# Patient Record
(demographics unavailable — no encounter records)

---

## 2025-07-03 NOTE — HISTORY OF PRESENT ILLNESS
[FreeTextEntry1] : 39 yo female presents with her  for newly diagnosed left breast invasive ductal carcinoma ([facility] PENDING). The preliminary results from [facility] are ER positive, [place] positive, HER2 negative with a low Ki-67. Patient presented for routine screening mammography in May which identified spiculated mass in the left breast. Diagnostic views were obtained which identified a 11 mm mass at 6:00 1cmFN. The patient was undergoing fertility evaluation and consideration for egg freezing. Biopsy was completed on 6/24/2025 and yielded invasive ductal carcinoma ([facility] PENDING). I discussed the diagnosis with the patient and her  at length.  I recommended genetic testing to be done today due to the family history of breast cancer and the young age of diagnosis.  She is amenable and agrees.  I also recommended bilateral breast MRI to evaluate the extent of disease as well as the contralateral breast and bilateral axilla.  We did offer psych oncology. The patient states that she would appreciate that very much.  She also states that she is interested in nutrition and information for healthy lifestyle and eating. She was also interested in referral to fertility at [facility] for a second opinion. I discussed the surgical options with the patient and her .  She would like to try for breast conservation if possible.  Family history of a maternal aunt breast cancer in her 70's.

## 2025-07-03 NOTE — DATA REVIEWED
[FreeTextEntry1] : 5/3/2025 ([facility]) bilateral screening mammogram: Heterogeneously dense. Spiculated mass in the central left breast posterior depth is indeterminant, additional views with targeted ultrasound is recommended. Bilateral complete ultrasound is recommended as patient dense tissue. BI-RADS 0  5/22/25 ([facility]) left diag mammo and left ultrasound: spiculated left breast mass at 6:00 1cmFN measuring 6 x 6 x 11 mm, correlates with mammo findings, further evaluation with US core biopsy is advised. BI-RADS 4  6/24/25 ([facility]) Left US core biopsy 6n1 11 mm mass [cork clip]: Invasive ductal carcinoma with focal micropapillary features, moderately differentiated. Ductal carcinoma in situ, intermediate to high nuclear grade, solid and cribriform type, central necrosis. Pathology results are concordant with the imaging findings. Surgical consultation is advised.

## 2025-07-03 NOTE — ASSESSMENT
[FreeTextEntry1] : 38-year-old female with a newly diagnosed left breast invasive ductal carcinoma, ER positive, [place] positive, HER2 negative Risks/benefits discussed with patient/surrogate

## 2025-07-03 NOTE — PAST MEDICAL HISTORY
[Menstruating] : The patient is menstruating [Menarche Age ____] : age at menarche was [unfilled] [Definite ___ (Date)] : the last menstrual period was [unfilled] [Irregular Cycle Intervals] : are  irregular [Total Preg ___] : G[unfilled] [History of Hormone Replacement Treatment] : has no history of hormone replacement treatment [FreeTextEntry6] : no [FreeTextEntry7] : yes - 20 yrs [FreeTextEntry8] : n/a

## 2025-07-03 NOTE — PLAN
[TextEntry] : Bilateral breast MRI Follow-up results of biomarkers Follow-up genetic testing results Referral to psych  oncology Referral to fertility Referral to nutrition Patient to follow-up after imaging completed for surgical planning

## 2025-07-14 NOTE — ASSESSMENT
[FreeTextEntry1] : 38-year-old female with a left breast invasive ductal carcinoma, ER positive, [place] positive, HER2 negative

## 2025-07-14 NOTE — DATA REVIEWED
[FreeTextEntry1] : 5/3/2025 ([facility]) bilateral screening mammogram: Heterogeneously dense. Spiculated mass in the central left breast posterior depth is indeterminant, additional views with targeted ultrasound is recommended. Bilateral complete ultrasound is recommended as patient dense tissue. BI-RADS 0  5/22/25 ([facility]) left diag mammo and left ultrasound: spiculated left breast mass at 6:00 1cmFN measuring 6 x 6 x 11 mm, correlates with mammo findings, further evaluation with US core biopsy is advised. BI-RADS 4  6/24/25 ([facility]) Left US core biopsy 6n1 11 mm mass [cork clip]: Invasive ductal carcinoma with focal micropapillary features, moderately differentiated. Ductal carcinoma in situ, intermediate to high nuclear grade, solid and cribriform type, central necrosis. Pathology results are concordant with the imaging findings. Surgical consultation is advised. ESTROGEN RECEPTOR: Positive % strong PROGESTERONE RECEPTOR: Positive % strong HER2 Negative (Score 0)  7/8/25 ([facility]) MRI: Recommend surgical management of known carcinoma in the left breast. No additional suspicious region of enhancement to warrant biopsy, within the limitations of extensive background parenchymal enhancement. This examination was reviewed without the benefit of prior studies; if any prior study becomes available for review, an addendum will be issued. RECOMMENDATION:  Surgical or oncologic management. BI-RADS 6 ADDENDUM: CORRECTION: Index lesion is located approximately 3.7 cm from the lateral surface of the skin; approximately 7 mm from the chest wall/pectoralis muscle and approximately 4.7 cm from the nipple.

## 2025-07-14 NOTE — PLAN
[TextEntry] : Patient to go to the OR on 8/6/2025 for left breast needle localization partial mastectomy and sentinel lymph node biopsy Consent signed and scanned to chart Patient to follow-up 7 to 10 days after surgery completed

## 2025-07-14 NOTE — HISTORY OF PRESENT ILLNESS
[FreeTextEntry1] : 39 yo female presents with her  for follow-up of newly diagnosed left breast invasive ductal carcinoma (ER+ [place]+ HER2 negative).  Patient underwent routine screening mammography in May which identified spiculated mass in the left breast. Diagnostic views were obtained which identified a 11 mm mass at 6:00 1cmFN. The patient was undergoing fertility evaluation and consideration for egg freezing. Biopsy was completed on 6/24/2025 and yielded invasive ductal carcinoma. 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 MRI was completed for EOD workup, which noted a mass associated with prior biopsy measuring 1.4cm; index lesion is located approximately 3.7 cm from the lateral surface of the skin; approximately 7 mm from the chest wall/pectoralis muscle and approximately 4.7 cm from the nipple. I discussed the imaging results with the patient and her  at length. We discussed the surgical options of breast conservation with lumpectomy with or without sentinel lymph node biopsy versus mastectomy with sentinel lymph node biopsy and possible axillary lymph node dissection. I explained the risks and benefits of the procedures, including but not limited to bleeding, infection, cancer recurrence, need for additional procedures, skin necrosis, loss of sensation, limitation of upper extremity range of motion, and lymphedema.     MARILYNN ROGER has decided on proceeding with a left breast lumpectomy with preoperative localization and sentinel lymph node biopsy. MARILYNN understands and agrees that a postoperative consultation with radiation oncology and medical oncology is required as they may need to undergo postoperative radiation, chemotherapy, and/or hormonal therapy. The patient has no further questions at this time.  Family history of a maternal aunt breast cancer in her 70's. The patient completed genetic testing which was negative for pathogenic mutation, but did identify a VUS in BRIP1